# Patient Record
Sex: FEMALE | Race: OTHER | NOT HISPANIC OR LATINO | URBAN - METROPOLITAN AREA
[De-identification: names, ages, dates, MRNs, and addresses within clinical notes are randomized per-mention and may not be internally consistent; named-entity substitution may affect disease eponyms.]

---

## 2019-05-15 ENCOUNTER — EMERGENCY (EMERGENCY)
Facility: HOSPITAL | Age: 54
LOS: 1 days | Discharge: HOME | End: 2019-05-15
Attending: EMERGENCY MEDICINE | Admitting: EMERGENCY MEDICINE
Payer: SELF-PAY

## 2019-05-15 VITALS
DIASTOLIC BLOOD PRESSURE: 85 MMHG | TEMPERATURE: 98 F | WEIGHT: 149.91 LBS | OXYGEN SATURATION: 100 % | RESPIRATION RATE: 18 BRPM | HEART RATE: 69 BPM | HEIGHT: 64.96 IN | SYSTOLIC BLOOD PRESSURE: 165 MMHG

## 2019-05-15 VITALS
OXYGEN SATURATION: 98 % | RESPIRATION RATE: 18 BRPM | TEMPERATURE: 98 F | HEART RATE: 73 BPM | SYSTOLIC BLOOD PRESSURE: 149 MMHG | DIASTOLIC BLOOD PRESSURE: 95 MMHG

## 2019-05-15 DIAGNOSIS — Z90.710 ACQUIRED ABSENCE OF BOTH CERVIX AND UTERUS: Chronic | ICD-10-CM

## 2019-05-15 DIAGNOSIS — R22.32 LOCALIZED SWELLING, MASS AND LUMP, LEFT UPPER LIMB: ICD-10-CM

## 2019-05-15 DIAGNOSIS — D17.22 BENIGN LIPOMATOUS NEOPLASM OF SKIN AND SUBCUTANEOUS TISSUE OF LEFT ARM: ICD-10-CM

## 2019-05-15 DIAGNOSIS — Z79.2 LONG TERM (CURRENT) USE OF ANTIBIOTICS: ICD-10-CM

## 2019-05-15 DIAGNOSIS — Z79.899 OTHER LONG TERM (CURRENT) DRUG THERAPY: ICD-10-CM

## 2019-05-15 PROCEDURE — 99283 EMERGENCY DEPT VISIT LOW MDM: CPT | Mod: 25

## 2019-05-15 PROCEDURE — 10060 I&D ABSCESS SIMPLE/SINGLE: CPT | Mod: LT

## 2019-05-15 PROCEDURE — 10060 I&D ABSCESS SIMPLE/SINGLE: CPT | Mod: 26

## 2019-05-15 RX ORDER — CEPHALEXIN 500 MG
1 CAPSULE ORAL
Qty: 14 | Refills: 0
Start: 2019-05-15 | End: 2019-05-21

## 2019-05-15 NOTE — ED PROVIDER NOTE - OBJECTIVE STATEMENT
54 y/o female with no PMHx is present in the ED c/o of an infected mass on her left arm. Pt reports she had a mass that has been present for the past 2 years that was no painful, however over the past 2 days it became red and it popped with a large amount of purulent discharge. Pt denies the following: fever, chills, numbness/tingling, redness/streaking down arm. Pt denies hx of prior I&D to the area.

## 2019-05-15 NOTE — ED ADULT NURSE NOTE - CHIEF COMPLAINT QUOTE
abscess to left arm with pain. PT's son quotes "she had it there for 2 years and now it got infected.

## 2019-05-15 NOTE — ED PROVIDER NOTE - CARE PROVIDER_API CALL
[facility])  Surgery  [address], [address] [place] [address]  Phone: [phone]  Fax: [phone]  Follow Up Time:     Fermín Mata)  Surgery  [address] [place] [address], [address]  [place] [address]  Phone: [phone]  Fax: [phone]  Follow Up Time:

## 2019-05-15 NOTE — ED PROVIDER NOTE - NSFOLLOWUPINSTRUCTIONS_ED_ALL_ED_FT
Lipoma Removal, Care After  Refer to this sheet in the next few weeks. These instructions provide you with information about caring for yourself after your procedure. Your health care provider may also give you more specific instructions. Your treatment has been planned according to current medical practices, but problems sometimes occur. Call your health care provider if you have any problems or questions after your procedure.    What can I expect after the procedure?  After the procedure, it is common to have:  Mild pain.  Swelling.  Bruising.  Follow these instructions at home:  Image   Bathing     Do not take baths, swim, or use a hot tub until your health care provider approves. Ask your health care provider if you can take showers. You may only be allowed to take sponge baths for bathing.  Keep your bandage (dressing) dry until your health care provider says it can be removed.  Incision care     Image   Follow instructions from your health care provider about how to take care of your incision. Make sure you:  Wash your hands with soap and water before you change your bandage (dressing). If soap and water are not available, use hand .  Change your dressing as told by your health care provider.  Leave stitches (sutures), skin glue, or adhesive strips in place. These skin closures may need to stay in place for 2 weeks or longer. If adhesive strip edges start to loosen and curl up, you may trim the loose edges. Do not remove adhesive strips completely unless your health care provider tells you to do that.  Check your incision area every day for signs of infection. Check for:  More redness, swelling, or pain.  Fluid or blood.  Warmth.  Pus or a bad smell.  Driving     Do not drive or operate heavy machinery while taking prescription pain medicine.  Do not drive for 24 hours if you received a medicine to help you relax (sedative) during your procedure.  Ask your health care provider when it is safe for you to drive.  General instructions     Take over-the-counter and prescription medicines only as told by your health care provider.  Do not use any tobacco products, such as cigarettes, chewing tobacco, and e-cigarettes. These can delay healing. If you need help quitting, ask your health care provider.  Return to your normal activities as told by your health care provider. Ask your health care provider what activities are safe for you.  Keep all follow-up visits as told by your health care provider. This is important.  Contact a health care provider if:  You have more redness, swelling, or pain around your incision.  You have fluid or blood coming from your incision.  Your incision feels warm to the touch.  You have pus or a bad smell coming from your incision.  You have pain that does not get better with medicine.  Get help right away if:  You have chills or a fever.  You have severe pain.  This information is not intended to replace advice given to you by your health care provider. Make sure you discuss any questions you have with your health care provider.

## 2019-05-15 NOTE — ED PROVIDER NOTE - CLINICAL SUMMARY MEDICAL DECISION MAKING FREE TEXT BOX
52 y/o female with 6 cm infected lipoma on left lateral upper arm with erythema. I&D conducted with a large amount of discharge, not odorous. No packing placed. Advised to continue wound care and follow up with Surgery for possible reoccurrence of lipoma. Will start on abx. Pt agrees with plan.

## 2019-05-15 NOTE — ED PROVIDER NOTE - ATTENDING CONTRIBUTION TO CARE
infected left arm lipoma.  no systemic symptoms/ well appearing.  I and d done by pa with pus extracted.  discussed f/u with surgery for removal of remaining lipoma once infection treated.

## 2019-07-12 PROBLEM — I10 ESSENTIAL (PRIMARY) HYPERTENSION: Chronic | Status: ACTIVE | Noted: 2019-05-15

## 2019-07-22 ENCOUNTER — OUTPATIENT (OUTPATIENT)
Dept: OUTPATIENT SERVICES | Facility: HOSPITAL | Age: 54
LOS: 1 days | Discharge: HOME | End: 2019-07-22

## 2019-07-22 DIAGNOSIS — Z90.710 ACQUIRED ABSENCE OF BOTH CERVIX AND UTERUS: Chronic | ICD-10-CM

## 2019-07-22 RX ORDER — DOCUSATE SODIUM 100 MG
1 CAPSULE ORAL
Qty: 20 | Refills: 0
Start: 2019-07-22 | End: 2019-07-31

## 2019-07-29 LAB — SURGICAL PATHOLOGY STUDY: SIGNIFICANT CHANGE UP

## 2020-03-30 NOTE — ED ADULT NURSE NOTE - NURSING SKIN WOUND LOCATION #1
foot infection suspected osteomyelitis foot infection suspected osteomyelitis foot infection suspected osteomyelitis foot infection suspected osteomyelitis foot infection suspected osteomyelitis foot infection suspected osteomyelitis foot infection suspected osteomyelitis foot infection suspected osteomyelitis foot infection suspected osteomyelitis arm

## 2020-08-21 NOTE — ED ADULT NURSE NOTE - NSIMPLEMENTINTERV_GEN_ALL_ED
Normal vision: sees adequately in most situations; can see medication labels, newsprint Implemented All Universal Safety Interventions:  [place] to call system. Call bell, personal items and telephone within reach. Instruct patient to call for assistance. Room bathroom lighting operational. Non-slip footwear when patient is off stretcher. Physically safe environment: no spills, clutter or unnecessary equipment. Stretcher in lowest position, wheels locked, appropriate side rails in place.

## 2023-10-18 NOTE — ED ADULT TRIAGE NOTE - CHIEF COMPLAINT QUOTE
Pt presents to ED c/o chest pain and SOB that has been ongoing for the last few days.   Saw Dr Morales today for f/u  Recent PE- currently on Eliquis  Denies leg pain or swelling.   No N/V  Locates CP to midsternal area   Pain 10/10 \"sharp\"  No past medical history on file.    
abscess to left arm with pain. PT's son quotes "she had it there for 2 years and now it got infected.

## 2025-04-10 NOTE — ED PROVIDER NOTE - PRINCIPAL DIAGNOSIS
Medication: aspirin passed protocol.   Last office visit date: 4-16-24  Next appointment scheduled?: Yes   Number of refills given: 3     Lipoma of upper extremity